# Patient Record
Sex: FEMALE | Race: OTHER | NOT HISPANIC OR LATINO | URBAN - METROPOLITAN AREA
[De-identification: names, ages, dates, MRNs, and addresses within clinical notes are randomized per-mention and may not be internally consistent; named-entity substitution may affect disease eponyms.]

---

## 2023-05-30 ENCOUNTER — EMERGENCY (EMERGENCY)
Facility: HOSPITAL | Age: 28
LOS: 1 days | Discharge: ANOTHER HOSPITAL | End: 2023-05-30
Attending: EMERGENCY MEDICINE | Admitting: EMERGENCY MEDICINE
Payer: MEDICAID

## 2023-05-30 VITALS
SYSTOLIC BLOOD PRESSURE: 118 MMHG | TEMPERATURE: 98 F | DIASTOLIC BLOOD PRESSURE: 74 MMHG | RESPIRATION RATE: 18 BRPM | OXYGEN SATURATION: 100 % | HEART RATE: 69 BPM

## 2023-05-30 DIAGNOSIS — R10.2 PELVIC AND PERINEAL PAIN: ICD-10-CM

## 2023-05-30 DIAGNOSIS — Z87.19 PERSONAL HISTORY OF OTHER DISEASES OF THE DIGESTIVE SYSTEM: ICD-10-CM

## 2023-05-30 DIAGNOSIS — R10.30 LOWER ABDOMINAL PAIN, UNSPECIFIED: ICD-10-CM

## 2023-05-30 DIAGNOSIS — Z87.42 PERSONAL HISTORY OF OTHER DISEASES OF THE FEMALE GENITAL TRACT: ICD-10-CM

## 2023-05-30 DIAGNOSIS — N83.9 NONINFLAMMATORY DISORDER OF OVARY, FALLOPIAN TUBE AND BROAD LIGAMENT, UNSPECIFIED: ICD-10-CM

## 2023-05-30 PROCEDURE — 99285 EMERGENCY DEPT VISIT HI MDM: CPT

## 2023-05-30 NOTE — ED ADULT TRIAGE NOTE - CHIEF COMPLAINT QUOTE
pt. c/o lower abdominal pain which began today, reports having appendicitis in January but was given antibiotics instead of removing her appendix.

## 2023-05-31 ENCOUNTER — EMERGENCY (EMERGENCY)
Facility: HOSPITAL | Age: 28
LOS: 1 days | Discharge: HOME | End: 2023-05-31
Attending: STUDENT IN AN ORGANIZED HEALTH CARE EDUCATION/TRAINING PROGRAM | Admitting: STUDENT IN AN ORGANIZED HEALTH CARE EDUCATION/TRAINING PROGRAM
Payer: MEDICAID

## 2023-05-31 VITALS
DIASTOLIC BLOOD PRESSURE: 67 MMHG | HEART RATE: 57 BPM | OXYGEN SATURATION: 99 % | SYSTOLIC BLOOD PRESSURE: 102 MMHG | TEMPERATURE: 98 F | RESPIRATION RATE: 16 BRPM

## 2023-05-31 VITALS
DIASTOLIC BLOOD PRESSURE: 86 MMHG | OXYGEN SATURATION: 96 % | SYSTOLIC BLOOD PRESSURE: 126 MMHG | WEIGHT: 156.09 LBS | TEMPERATURE: 99 F | RESPIRATION RATE: 16 BRPM | HEIGHT: 63 IN | HEART RATE: 73 BPM

## 2023-05-31 VITALS
DIASTOLIC BLOOD PRESSURE: 66 MMHG | HEART RATE: 69 BPM | TEMPERATURE: 99 F | OXYGEN SATURATION: 97 % | RESPIRATION RATE: 16 BRPM | SYSTOLIC BLOOD PRESSURE: 110 MMHG

## 2023-05-31 DIAGNOSIS — Z87.19 PERSONAL HISTORY OF OTHER DISEASES OF THE DIGESTIVE SYSTEM: ICD-10-CM

## 2023-05-31 DIAGNOSIS — R10.2 PELVIC AND PERINEAL PAIN: ICD-10-CM

## 2023-05-31 DIAGNOSIS — N80.121 DEEP ENDOMETRIOSIS OF RIGHT OVARY: ICD-10-CM

## 2023-05-31 DIAGNOSIS — R19.7 DIARRHEA, UNSPECIFIED: ICD-10-CM

## 2023-05-31 DIAGNOSIS — R20.2 PARESTHESIA OF SKIN: ICD-10-CM

## 2023-05-31 DIAGNOSIS — N83.201 UNSPECIFIED OVARIAN CYST, RIGHT SIDE: ICD-10-CM

## 2023-05-31 LAB
ALBUMIN SERPL ELPH-MCNC: 3.8 G/DL — SIGNIFICANT CHANGE UP (ref 3.4–5)
ALP SERPL-CCNC: 48 U/L — SIGNIFICANT CHANGE UP (ref 40–120)
ALT FLD-CCNC: 19 U/L — SIGNIFICANT CHANGE UP (ref 12–42)
ANION GAP SERPL CALC-SCNC: 8 MMOL/L — LOW (ref 9–16)
APPEARANCE UR: CLEAR — SIGNIFICANT CHANGE UP
AST SERPL-CCNC: 23 U/L — SIGNIFICANT CHANGE UP (ref 15–37)
BASOPHILS # BLD AUTO: 0.06 K/UL — SIGNIFICANT CHANGE UP (ref 0–0.2)
BASOPHILS NFR BLD AUTO: 0.5 % — SIGNIFICANT CHANGE UP (ref 0–2)
BILIRUB SERPL-MCNC: 0.3 MG/DL — SIGNIFICANT CHANGE UP (ref 0.2–1.2)
BILIRUB UR-MCNC: NEGATIVE — SIGNIFICANT CHANGE UP
BUN SERPL-MCNC: 5 MG/DL — LOW (ref 7–23)
CALCIUM SERPL-MCNC: 8.9 MG/DL — SIGNIFICANT CHANGE UP (ref 8.5–10.5)
CHLORIDE SERPL-SCNC: 104 MMOL/L — SIGNIFICANT CHANGE UP (ref 96–108)
CO2 SERPL-SCNC: 26 MMOL/L — SIGNIFICANT CHANGE UP (ref 22–31)
COLOR SPEC: YELLOW — SIGNIFICANT CHANGE UP
CREAT SERPL-MCNC: 0.75 MG/DL — SIGNIFICANT CHANGE UP (ref 0.5–1.3)
DIFF PNL FLD: NEGATIVE — SIGNIFICANT CHANGE UP
EGFR: 110 ML/MIN/1.73M2 — SIGNIFICANT CHANGE UP
EOSINOPHIL # BLD AUTO: 0.06 K/UL — SIGNIFICANT CHANGE UP (ref 0–0.5)
EOSINOPHIL NFR BLD AUTO: 0.5 % — SIGNIFICANT CHANGE UP (ref 0–6)
GLUCOSE SERPL-MCNC: 96 MG/DL — SIGNIFICANT CHANGE UP (ref 70–99)
GLUCOSE UR QL: NEGATIVE MG/DL — SIGNIFICANT CHANGE UP
HCG SERPL-ACNC: 1 MIU/ML — SIGNIFICANT CHANGE UP
HCG UR QL: NEGATIVE — SIGNIFICANT CHANGE UP
HCT VFR BLD CALC: 39.6 % — SIGNIFICANT CHANGE UP (ref 34.5–45)
HGB BLD-MCNC: 13 G/DL — SIGNIFICANT CHANGE UP (ref 11.5–15.5)
IMM GRANULOCYTES NFR BLD AUTO: 0.4 % — SIGNIFICANT CHANGE UP (ref 0–0.9)
KETONES UR-MCNC: 15 MG/DL
LACTATE SERPL-SCNC: 0.5 MMOL/L — SIGNIFICANT CHANGE UP (ref 0.4–2)
LEUKOCYTE ESTERASE UR-ACNC: NEGATIVE — SIGNIFICANT CHANGE UP
LIDOCAIN IGE QN: 63 U/L — LOW (ref 73–393)
LYMPHOCYTES # BLD AUTO: 0.93 K/UL — LOW (ref 1–3.3)
LYMPHOCYTES # BLD AUTO: 8.3 % — LOW (ref 13–44)
MCHC RBC-ENTMCNC: 29.8 PG — SIGNIFICANT CHANGE UP (ref 27–34)
MCHC RBC-ENTMCNC: 32.8 GM/DL — SIGNIFICANT CHANGE UP (ref 32–36)
MCV RBC AUTO: 90.8 FL — SIGNIFICANT CHANGE UP (ref 80–100)
MONOCYTES # BLD AUTO: 0.71 K/UL — SIGNIFICANT CHANGE UP (ref 0–0.9)
MONOCYTES NFR BLD AUTO: 6.3 % — SIGNIFICANT CHANGE UP (ref 2–14)
NEUTROPHILS # BLD AUTO: 9.47 K/UL — HIGH (ref 1.8–7.4)
NEUTROPHILS NFR BLD AUTO: 84 % — HIGH (ref 43–77)
NITRITE UR-MCNC: NEGATIVE — SIGNIFICANT CHANGE UP
NRBC # BLD: 0 /100 WBCS — SIGNIFICANT CHANGE UP (ref 0–0)
PH UR: 7 — SIGNIFICANT CHANGE UP (ref 5–8)
PLATELET # BLD AUTO: 265 K/UL — SIGNIFICANT CHANGE UP (ref 150–400)
POTASSIUM SERPL-MCNC: 3.7 MMOL/L — SIGNIFICANT CHANGE UP (ref 3.5–5.3)
POTASSIUM SERPL-SCNC: 3.7 MMOL/L — SIGNIFICANT CHANGE UP (ref 3.5–5.3)
PROT SERPL-MCNC: 7.5 G/DL — SIGNIFICANT CHANGE UP (ref 6.4–8.2)
PROT UR-MCNC: NEGATIVE MG/DL — SIGNIFICANT CHANGE UP
RBC # BLD: 4.36 M/UL — SIGNIFICANT CHANGE UP (ref 3.8–5.2)
RBC # FLD: 13.5 % — SIGNIFICANT CHANGE UP (ref 10.3–14.5)
SODIUM SERPL-SCNC: 138 MMOL/L — SIGNIFICANT CHANGE UP (ref 132–145)
SP GR SPEC: 1 — LOW (ref 1–1.03)
UROBILINOGEN FLD QL: 0.2 MG/DL — SIGNIFICANT CHANGE UP (ref 0.2–1)
WBC # BLD: 11.27 K/UL — HIGH (ref 3.8–10.5)
WBC # FLD AUTO: 11.27 K/UL — HIGH (ref 3.8–10.5)

## 2023-05-31 PROCEDURE — 99284 EMERGENCY DEPT VISIT MOD MDM: CPT | Mod: 25

## 2023-05-31 PROCEDURE — 76830 TRANSVAGINAL US NON-OB: CPT | Mod: 26

## 2023-05-31 PROCEDURE — 76830 TRANSVAGINAL US NON-OB: CPT

## 2023-05-31 PROCEDURE — 76856 US EXAM PELVIC COMPLETE: CPT

## 2023-05-31 PROCEDURE — 76856 US EXAM PELVIC COMPLETE: CPT | Mod: 26

## 2023-05-31 PROCEDURE — 99285 EMERGENCY DEPT VISIT HI MDM: CPT

## 2023-05-31 PROCEDURE — 99284 EMERGENCY DEPT VISIT MOD MDM: CPT

## 2023-05-31 PROCEDURE — 74177 CT ABD & PELVIS W/CONTRAST: CPT | Mod: 26

## 2023-05-31 RX ORDER — SODIUM CHLORIDE 9 MG/ML
1000 INJECTION INTRAMUSCULAR; INTRAVENOUS; SUBCUTANEOUS ONCE
Refills: 0 | Status: COMPLETED | OUTPATIENT
Start: 2023-05-31 | End: 2023-05-31

## 2023-05-31 RX ADMIN — SODIUM CHLORIDE 1000 MILLILITER(S): 9 INJECTION INTRAMUSCULAR; INTRAVENOUS; SUBCUTANEOUS at 00:16

## 2023-05-31 NOTE — ED PROVIDER NOTE - OBJECTIVE STATEMENT
28 yr old female, history of appendicitis (medical tx only), presents to the Emergency Department as transfer from [facility] for US / GYN evaluation. pt initially went to [facility] for sudden onset lower abd pain similar to previous cyst rupture.   at [facility] - wbc count 11.27, UA w/o infection. CT w normal appendix, enlarged right ovary due to large ovarian cyst suspicion for ovarian torsion. pt transferred for urgent tvus.   on arrival pt pain somewhat improved. 3-4/10. no n/v. no abd pain.       CT "IMPRESSION:  Markedly enlarged right ovary due to a 6.8 cm complex cyst. The ovary is   medialized and anterior to the uterus, suspicious for ovarian torsion.   Recommend emergent pelvic ultrasound to exclude ovarian torsion.  Normal appendix."

## 2023-05-31 NOTE — ED PROVIDER NOTE - PATIENT PORTAL LINK FT
You can access the FollowMyHealth Patient Portal offered by [facility] by registering at the following website: [email or web address]. By joining [facility]’s FollowMyHealth portal, you will also be able to view your health information using other applications (apps) compatible with our system.

## 2023-05-31 NOTE — ED ADULT NURSE NOTE - CHIEF COMPLAINT QUOTE
Pt transferred from [facility] ED for r/o ovarian torsion Pt c/o bilateral lower abd/pelvic pain since 9pm. Pt denies any N/V/D, no urinary symptoms.

## 2023-05-31 NOTE — ED PROVIDER NOTE - ATTENDING CONTRIBUTION TO CARE
I have seen the pt, reviewed all pertinent clinical data, and I agree with the documentation/care/plan executed by HERI Poole. + enlarged ovary w/lower abd vs pelvic pain, requires emergent transfer to [facility] for u/s to r/o torsion.

## 2023-05-31 NOTE — ED ADULT NURSE NOTE - OBJECTIVE STATEMENT
Patient complaints of bilateral lower abdominal/pelvic pain since yesterday 9pm. transferred from [facility] r/o ovarian torsion.   States pain is getting better now. denies SOB, CP, N/V/D or any other discomfort at this time.  Patient is alert and oriented, A&O4, steady gait noted.

## 2023-05-31 NOTE — ED ADULT NURSE REASSESSMENT NOTE - NS ED NURSE REASSESS COMMENT FT1
Received patient from GEOVANNA Ward. Pt resting in stretcher, in no acute distress at this time, respirations even bilaterally. Safety measures maintained, pending further dispo.

## 2023-05-31 NOTE — ED ADULT NURSE REASSESSMENT NOTE - NS ED NURSE REASSESS COMMENT FT1
Report received from GEOVANNA Palacios. Pt resting on exam table. Appears in distress, A+Ox4, no respiratory distress noted. Pending CT read.

## 2023-05-31 NOTE — ED PROVIDER NOTE - PROGRESS NOTE DETAILS
CT shows 6.8cm ovary - needs pelvis US for further eval.  Will send to [facility] - patient agreeable.

## 2023-05-31 NOTE — ED PROVIDER NOTE - OBJECTIVE STATEMENT
28-year-old female, history of appendicitis only medically treated, presenting to the ED complaining of lower abdominal pain that suddenly began while working about 2 hours ago.  Patient states the pain is improved from when it first began.  She also endorses history of a prior right-sided ovarian cyst with rupture.  She states the symptoms are somewhat similar to that as well.  In January of this year, when patient was diagnosed with appendicitis, it was treated medically without any surgical removal.  She denies fevers, chills, urinary symptoms, nausea, vomiting, diarrhea, chest pain, shortness of breath, headaches or dizziness.

## 2023-05-31 NOTE — CONSULT NOTE ADULT - SUBJECTIVE AND OBJECTIVE BOX
29yo  presents to the [facility] ER from [facility] for r/o ovarian torsion.  Pt complains of sudden onset of whole body pain around  last night.  Pt states she was at work and she had severe abdominal pain that radiated to her legs and arms and caused her to feel tingling.  Pt states the pain lasted 4 hours and describes pain as diffuse sharp abdominal pain on Left and Right side. Pt also reports 1 episode of diarrhea during this time.  Pt states  she has had R ovarian cysts since January of this year and has been following her obgyn for observation.  Pt has been to the ER 2 other times this year for ovarian cyst pain in January and February and was once found to have appendicitis that was treated medically.  Pt states the pain she felt overnight was not similar to her previous R sided ovarian cyst pain.  Pt states she never received pain medication in either ER overnight and is no longer feeling pain.  Pt denies fever, chills, chest pain, SOB, abdominal pain at this time, nausea, vomiting, vaginal bleeding      OB/GYN Hx: G1: [facility] med 2017   Last PAP smear: abnl papsmear in , requiring colposcopy wnl.  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     PHYSICAL EXAM:   Vital Signs Last 24 Hrs  T(C): 37 (31 May 2023 03:32), Max: 37.1 (31 May 2023 03:05)  T(F): 98.6 (31 May 2023 03:32), Max: 98.8 (31 May 2023 03:05)  HR: 73 (31 May 2023 03:32) (69 - 75)  BP: 126/86 (31 May 2023 03:32) (110/66 - 126/86)  RR: 16 (31 May 2023 03:32) (16 - 19)  SpO2: 96% (31 May 2023 03:32) (96% - 100%)    Parameters below as of 31 May 2023 03:32  Patient On (Oxygen Delivery Method): room air        **************************  Constitutional: Alert & Oriented x3, No acute distress  Respiratory: Clear to ausculation bilaterally; no wheezing, rhonchi, or crackles  Cardiovascular: regular rate and rhythm, no murmurs, or gallops  Gastrointestinal: soft, non tender, positive bowel sounds, no rebound or guarding   Pelvic exam: closed cervix, normal physiologic discharge, no CMT  Extremities: no calf tenderness or swelling    LABS:                        13.0   11.27 )-----------( 265      ( 31 May 2023 00:00 )             39.6         138  |  104  |  5<L>  ----------------------------<  96  3.7   |  26  |  0.75    Ca    8.9      31 May 2023 00:00    TPro  7.5  /  Alb  3.8  /  TBili  0.3  /  DBili  x   /  AST  23  /  ALT  19  /  AlkPhos  48        Urinalysis Basic - ( 31 May 2023 00:00 )    Color: Yellow / Appearance: Clear / S.004 / pH: x  Gluc: x / Ketone: 15 mg/dL  / Bili: Negative / Urobili: 0.2 mg/dL   Blood: x / Protein: Negative mg/dL / Nitrite: Negative   Leuk Esterase: Negative / RBC: x / WBC x   Sq Epi: x / Non Sq Epi: x / Bacteria: x      HCG Quantitative, Serum: 1 mIU/mL ( @ 00:00)      RADIOLOGY & ADDITIONAL STUDIES:   ACC: [id number] EXAM:  CT ABDOMEN AND PELVIS IC   ORDERED BY: JONH RIVERA     PROCEDURE DATE:  2023          INTERPRETATION:  CLINICAL INFORMATION: Lower abdominal pain. History of   appendicitis without appendectomy.    COMPARISON: None.    CONTRAST/COMPLICATIONS:  IV Contrast: Omnipaque 350  96 cc administered   4 cc discarded  Oral Contrast: NONE  Complications: None reported at time of study completion    PROCEDURE:  CT of the Abdomen and Pelvis was performed.  Sagittal and coronal reformats were performed.    FINDINGS:  LOWER CHEST: Within normal limits.    LIVER: Within normal limits.  BILE DUCTS: Normal caliber.  GALLBLADDER: Within normal limits.  SPLEEN: Within normal limits.  PANCREAS: Within normal limits.  ADRENALS: Within normal limits.  KIDNEYS/URETERS: Within normal limits.    BLADDER: Within normal limits.  REPRODUCTIVE ORGANS: The right ovary is markedly enlarged, 5.1 x 6.7 x   6.1 cm due to a 4.6 x 6.8 x 4.7 cm complex cyst with thin internal   septation. The right ovary is medialized and anterior to the uterus,   suspicious for ovarian torsion. Normal uterus and left adnexa.    BOWEL: No bowel obstruction. Large colonic stool burden, which may   represent constipation.. Appendix is normal.  PERITONEUM: No ascites.  VESSELS: Within normal limits.  RETROPERITONEUM/LYMPH NODES: No lymphadenopathy.  ABDOMINAL WALL: Within normal limits.  BONES: Within normal limits.    IMPRESSION:    Markedly enlarged right ovary due to a 6.8 cm complex cyst. The ovary is   medialized and anterior to the uterus, suspicious for ovarian torsion.   Recommend emergent pelvic ultrasound to exclude ovarian torsion.    Normal appendix.    Findings were discussed with HERI Rivera 2023 2:02 AM by Dr. Salter with read back confirmation.    --- End of Report ---          MIGUEL SALTER MD; Resident Radiologist  This document has been electronically signed.  ELISA ADAMS MD; Attending Radiologist  This document has been electronically signed. May 31 2023  2:22AM  ACC: [id number] EXAM:  CT ABDOMEN AND PELVIS IC   ORDERED BY: JONH RIVERA     PROCEDURE DATE:  2023          INTERPRETATION:  CLINICAL INFORMATION: Lower abdominal pain. History of   appendicitis without appendectomy.    COMPARISON: None.    CONTRAST/COMPLICATIONS:  IV Contrast: Omnipaque 350  96 cc administered   4 cc discarded  Oral Contrast: NONE  Complications: None reported at time of study completion    PROCEDURE:  CT of the Abdomen and Pelvis was performed.  Sagittal and coronal reformats were performed.    FINDINGS:  LOWER CHEST: Within normal limits.    LIVER: Within normal limits.  BILE DUCTS: Normal caliber.  GALLBLADDER: Within normal limits.  SPLEEN: Within normal limits.  PANCREAS: Within normal limits.  ADRENALS: Within normal limits.  KIDNEYS/URETERS: Within normal limits.    BLADDER: Within normal limits.  REPRODUCTIVE ORGANS: The right ovary is markedly enlarged, 5.1 x 6.7 x   6.1 cm due to a 4.6 x 6.8 x 4.7 cm complex cyst with thin internal   septation. The right ovary is medialized and anterior to the uterus,   suspicious for ovarian torsion. Normal uterus and left adnexa.    BOWEL: No bowel obstruction. Large colonic stool burden, which may   represent constipation.. Appendix is normal.  PERITONEUM: No ascites.  VESSELS: Within normal limits.  RETROPERITONEUM/LYMPH NODES: No lymphadenopathy.  ABDOMINAL WALL: Within normal limits.  BONES: Within normal limits.    IMPRESSION:    Markedly enlarged right ovary due to a 6.8 cm complex cyst. The ovary is   medialized and anterior to the uterus, suspicious for ovarian torsion.   Recommend emergent pelvic ultrasound to exclude ovarian torsion.    Normal appendix.    Findings were discussed with HERI Rivera 2023 2:02 AM by Dr. Salter with read back confirmation.    --- End of Report ---          MIGUEL SALTER MD; Resident Radiologist  This document has been electronically signed.  ELISA ADAMS MD; Attending Radiologist  This document has been electronically signed. May 31 2023  2:22AMACC: [id number] EXAM:  CT ABDOMEN AND PELVIS IC   ORDERED BY: JONH RIVERA     PROCEDURE DATE:  2023          INTERPRETATION:  CLINICAL INFORMATION: Lower abdominal pain. History of   appendicitis without appendectomy.    COMPARISON: None.    CONTRAST/COMPLICATIONS:  IV Contrast: Omnipaque 350  96 cc administered   4 cc discarded  Oral Contrast: NONE  Complications: None reported at time of study completion    PROCEDURE:  CT of the Abdomen and Pelvis was performed.  Sagittal and coronal reformats were performed.    FINDINGS:  LOWER CHEST: Within normal limits.    LIVER: Within normal limits.  BILE DUCTS: Normal caliber.  GALLBLADDER: Within normal limits.  SPLEEN: Within normal limits.  PANCREAS: Within normal limits.  ADRENALS: Within normal limits.  KIDNEYS/URETERS: Within normal limits.    BLADDER: Within normal limits.  REPRODUCTIVE ORGANS: The right ovary is markedly enlarged, 5.1 x 6.7 x   6.1 cm due to a 4.6 x 6.8 x 4.7 cm complex cyst with thin internal   septation. The right ovary is medialized and anterior to the uterus,   suspicious for ovarian torsion. Normal uterus and left adnexa.    BOWEL: No bowel obstruction. Large colonic stool burden, which may   represent constipation.. Appendix is normal.  PERITONEUM: No ascites.  VESSELS: Within normal limits.  RETROPERITONEUM/LYMPH NODES: No lymphadenopathy.  ABDOMINAL WALL: Within normal limits.  BONES: Within normal limits.    IMPRESSION:    Markedly enlarged right ovary due to a 6.8 cm complex cyst. The ovary is   medialized and anterior to the uterus, suspicious for ovarian torsion.   Recommend emergent pelvic ultrasound to exclude ovarian torsion.    Normal appendix.    Findings were discussed with HERI Rivera 2023 2:02 AM by Dr. Salter with read back confirmation.    --- End of Report ---          MIGUEL SALTRE MD; Resident Radiologist  This document has been electronically signed.  ELISA ADAMS MD; Attending Radiologist  This document has been electronically signed. May 31 2023  2:22AM  ACC: [id number] EXAM:  CT ABDOMEN AND PELVIS IC   ORDERED BY: JONH RIVERA     PROCEDURE DATE:  2023          INTERPRETATION:  CLINICAL INFORMATION: Lower abdominal pain. History of   appendicitis without appendectomy.    COMPARISON: None.    CONTRAST/COMPLICATIONS:  IV Contrast: Omnipaque 350  96 cc administered   4 cc discarded  Oral Contrast: NONE  Complications: None reported at time of study completion    PROCEDURE:  CT of the Abdomen and Pelvis was performed.  Sagittal and coronal reformats were performed.    FINDINGS:  LOWER CHEST: Within normal limits.    LIVER: Within normal limits.  BILE DUCTS: Normal caliber.  GALLBLADDER: Within normal limits.  SPLEEN: Within normal limits.  PANCREAS: Within normal limits.  ADRENALS: Within normal limits.  KIDNEYS/URETERS: Within normal limits.    BLADDER: Within normal limits.  REPRODUCTIVE ORGANS: The right ovary is markedly enlarged, 5.1 x 6.7 x   6.1 cm due to a 4.6 x 6.8 x 4.7 cm complex cyst with thin internal   septation. The right ovary is medialized and anterior to the uterus,   suspicious for ovarian torsion. Normal uterus and left adnexa.    BOWEL: No bowel obstruction. Large colonic stool burden, which may   represent constipation.. Appendix is normal.  PERITONEUM: No ascites.  VESSELS: Within normal limits.  RETROPERITONEUM/LYMPH NODES: No lymphadenopathy.  ABDOMINAL WALL: Within normal limits.  BONES: Within normal limits.    IMPRESSION:    Markedly enlarged right ovary due to a 6.8 cm complex cyst. The ovary is   medialized and anterior to the uterus, suspicious for ovarian torsion.   Recommend emergent pelvic ultrasound to exclude ovarian torsion.    Normal appendix.    Findings were discussed with HERI Rivera 2023 2:02 AM by Dr. Salter with read back confirmation.    --- End of Report ---          MIGUEL SALTER MD; Resident Radiologist  This document has been electronically signed.  ELISA ADAMS MD; Attending Radiologist  This document has been electronically signed. May 31 2023  2:22AM      ******PRELIMINARY REPORT******      ******PRELIMINARY REPORT******         ACC: [id number] EXAM:  US PELVIC COMPLETE   ORDERED BY: EDER GREEN     ACC: [id number] EXAM:  US TRANSVAGINAL   ORDERED BY: EDER GREEN     PROCEDURE DATE:  2023    ******PRELIMINARY REPORT******      ******PRELIMINARY REPORT******           INTERPRETATION:  CLINICAL INFORMATION: Evaluate for ovarian cyst/torsion.    LMP: 2023    COMPARISON: CT abdomen/pelvis 2023    TECHNIQUE:  Endovaginal and transabdominal pelvic sonogram. Color and Spectral   Doppler was performed.    FINDINGS:  Uterus: 7.5 x 3.4 x 4.7 cm. Within normal limits.  Endometrium: 0.4 cm. Within normal limits.    Right ovary: Enlarged, 6.7 x 5.0 x 5.4 cm with a volume of 92 mL. There   are two complex cysts. One measures 4.5 x 4.3 x 4.5 cm, has low-level   internal echoes and a 1.0 x 0.7 x 0.9 cm avascular, echogenic nodule   which may represent hemorrhagic cyst with adherent clot. The other   measures 4.2 x 2.2 x 2.7 cm and has low level internal echoes. Normal   arterial and venous waveforms.  Left ovary: 3.7 x 1.9 x 1.8 cm. Within normal limits. Normal arterial and   venous waveforms.    Fluid: None.    IMPRESSION:    1. Enlarged right ovary with two complex cysts that may represent   hemorrhagic cysts versus endometriomas. Follow-up ultrasound in 6-12   weeks to assess for resolution is recommended.    2. Arterial and venous flow is present to the right ovary at the time of   this study, however intermittent torsion cannot be excluded.          ******PRELIMINARY REPORT******      ******PRELIMINARY REPORT******       MIGUEL SALTER MD; Resident Radiologist  This document is a PRELIMINARY interpretation and is pending final   attending approval. May 31 2023  5:37AM

## 2023-05-31 NOTE — ED PROVIDER NOTE - NS ED ATTENDING STATEMENT MOD
I have seen and examined this patient and fully participated in the care of this patient as the teaching attending.  The service was shared with the JADE.  I reviewed and verified the documentation and independently performed the documented:

## 2023-05-31 NOTE — ED PROVIDER NOTE - PROGRESS NOTE DETAILS
chavez -   US "IMPRESSION:  1. Enlarged right ovary with two complex cysts that may represent   hemorrhagic cysts versus endometriomas. Follow-up ultrasound in 6-12   weeks to assess for resolution is recommended.  2. Arterial and venous flow is present to the right ovary at the time of   this study, however intermittent torsion cannot be excluded."    US as above. on reeval after imaging pain is nearly resolved. 1/10. abd exam benign. gyn consulted to r/o intermittent torsion given imaging findings.   signed out to day team pending gyn eval, dispo per gyn recs. Director - pt received from night team.  Pt resting, reports pain much improved 0-1/10.  VS, labs, imaging reviewed.  Await gyn eval and recommendations; pt states gyn had just left her bedside. Salazar- pt pain remains resolved.  eval by gyn w/ unremarkable exam, likely dc but wants to wait for final rd of sonogram Director - final read w endometriomas, + good flow.  Pt feeling well.  GYN cleared pt for dc.

## 2023-05-31 NOTE — ED PROVIDER NOTE - NSFOLLOWUPINSTRUCTIONS_ED_ALL_ED_FT
OVARIAN CYST - Your workup today has revealed an OVARIAN CYST. Your results are on the pages to follow. This is not completely normal, but is a common finding, and while these cysts can commonly cause discomfort if they change in size or burst, it is usually not life threatening and can be managed safely in the outpatient setting. Regardless, it can be very uncomfortable and requires pain control regularly until the symptoms have resolved. See below for recommendations...    FOLLOW UP - Follow up with GYNECOLOGY within one week.    [facility]  [address]   [phone]    RETURN PRECAUTIONS - Return to the Emergency Department for persistent, worsening, or new symptoms including worsening abdominal pain not controlled by medication suggested, vaginal bleeding more than 4 pads soaked per hour, fever > 102, blood in urine, or any other serious concerns.    ____      PAIN MEDICATIONS -     TYLENOL / MOTRIN -    For symptoms take Motrin and tylenol - THESE MEDICATIONS DO NOT REQUIRE A PRESCRIPTION AND CAN BE PURCHASED IN ANY PHARMACY. Take motrin 600mg every 6 hours as needed, take with food - discontinue use if you notice abdominal pain, blood in stool or black stools. AND / OR... Take tylenol as needed - 650mg every 6 hours as needed, do not exceed 4000mg in 24 hours. You can take one medication or the other. Or, if one medication alone does not seem to be working, you can actually take both tylenol and motrin together and that is acceptable / safe but you should alternate the medications so you are taking something every few hours; consider the following regimen - Take one medication every 3 hours. 7am motrin with breakfast, 10am tylenol, 1pm motirn with lunch, 4pm tylenol, 7pm motrin with dinner, 10pm tylneol before bed. Please rest and remain well hydrated with plenty of fluids.  You can take motrin 600-800mg and tylenol 650mg every 3 hours, switching between the two for pain    OVARIAN CYST - Your workup today has revealed an OVARIAN CYST. Your results are on the pages to follow. This is not completely normal, but is a common finding, and while these cysts can commonly cause discomfort if they change in size or burst, it is usually not life threatening and can be managed safely in the outpatient setting. Regardless, it can be very uncomfortable and requires pain control regularly until the symptoms have resolved. See below for recommendations...    FOLLOW UP - Follow up with GYNECOLOGY within one week.    [facility]  [address]   [phone]    RETURN PRECAUTIONS - Return to the Emergency Department for persistent, worsening, or new symptoms including worsening abdominal pain not controlled by medication suggested, vaginal bleeding more than 4 pads soaked per hour, fever > 102, blood in urine, or any other serious concerns.    ____      PAIN MEDICATIONS -     TYLENOL / MOTRIN -    For symptoms take Motrin and tylenol - THESE MEDICATIONS DO NOT REQUIRE A PRESCRIPTION AND CAN BE PURCHASED IN ANY PHARMACY. Take motrin 600mg every 6 hours as needed, take with food - discontinue use if you notice abdominal pain, blood in stool or black stools. AND / OR... Take tylenol as needed - 650mg every 6 hours as needed, do not exceed 4000mg in 24 hours. You can take one medication or the other. Or, if one medication alone does not seem to be working, you can actually take both tylenol and motrin together and that is acceptable / safe but you should alternate the medications so you are taking something every few hours; consider the following regimen - Take one medication every 3 hours. 7am motrin with breakfast, 10am tylenol, 1pm motirn with lunch, 4pm tylenol, 7pm motrin with dinner, 10pm tylneol before bed.

## 2023-05-31 NOTE — CONSULT NOTE ADULT - ASSESSMENT
29yo  presents to the ED for abdominal pain overnight, with known 6.8cm R ovarian cyst, r/o ovarian torsion.   -Pt seen at bedside in no apparent pain/distress.  Abdominal exam benign. Pt denies all complaints now and has not received any pain medications    -VSS, H stable as above at 13, no vaginal bleeding.   -Pt sent over for US evaluation after CT above resulted as Markedly enlarged right ovary due to a 6.8 cm complex cyst. The ovary is  medialized and anterior to the uterus, suspicious for ovarian torsion.  Recommend emergent pelvic ultrasound to exclude ovarian torsion. Normal appendix.  -US done here shows flow, no evidence of ovarian torsion on US or clinical. Enlarged right ovary with two complex cysts that may represent hemorrhagic cysts versus endometriomas. Follow-up ultrasound in 6-12 weeks to assess for resolution is recommended.  Arterial and venous flow is present to the right ovary at the time of his study, however intermittent torsion cannot be excluded.  -Pt has not had pain since 12AM overnight- and pain described seems diffuse to whole body. Abdominal exam is benign and pt has not been given any pain medication.  Pt advised to come back to the ER right away if she is experiencing any pain. Given strict return precautions.   -Pt requesting new OB-GYN pt may follow with our clinic on [address] [phone]. Please have her make appointment for this next week to evaluate and management of cyst  -Dr. Lutz in agreement w/ plan and pt is clinically stable for discharge, pt is unlikely torsed and has flow on US and has not had pain for 8 hours now.  Pt has not received pain medication throughout time in ERs and describes pain as whole body.  Pt to follow with [facility] clinic within the week to discuss management of cyst

## 2023-05-31 NOTE — ED ADULT NURSE NOTE - NSFALLUNIVINTERV_ED_ALL_ED
Bed/Stretcher in lowest position, wheels locked, appropriate side rails in place/Call bell, personal items and telephone in reach/Instruct patient to call for assistance before getting out of bed/chair/stretcher/Non-slip footwear applied when patient is off stretcher/[place] to call system/Physically safe environment - no spills, clutter or unnecessary equipment/Purposeful proactive rounding/Room/bathroom lighting operational, light cord in reach

## 2023-05-31 NOTE — ED PROVIDER NOTE - NS ED ROS FT
+abd pain  Denies fevers, chills, nausea, vomiting, diarrhea, constipation, urinary symptoms, chest pain, palpitations, shortness of breath, dyspnea on exertion, syncope/near syncope, cough/URI symptoms, headache, weakness, numbness, focal deficits, visual changes, gait or balance changes, dizziness

## 2023-05-31 NOTE — ED PROVIDER NOTE - PHYSICAL EXAMINATION
VITAL SIGNS: I have reviewed nursing notes and confirm.  CONSTITUTIONAL: Well-developed; well-nourished; in no acute distress.  SKIN: Skin is warm and dry, no acute rash.  HEAD: Normocephalic; atraumatic.  NECK: Supple; non tender.  CARD: S1, S2 normal; no murmurs, gallops, or rubs. Regular rate and rhythm.  RESP: No wheezes, rales or rhonchi.  ABD: Soft; non-distended; +tatianna lower abdominal ttp - mild; no rebound or guarding.  EXT: Normal ROM. No clubbing, cyanosis or edema.  NEURO: Alert, oriented. Grossly unremarkable. WHITMORE, normal tone, no gross motor or sensory changes. Fluent speech.   PSYCH: Cooperative, appropriate. Mood and affect wnl. VITAL SIGNS: I have reviewed nursing notes and confirm.  CONSTITUTIONAL: Well-developed; well-nourished; in no acute distress.  SKIN: Skin is warm and dry, no acute rash.  HEAD: Normocephalic; atraumatic.  NECK: Supple; non tender.  CARD: S1, S2 normal; no murmurs, gallops, or rubs. Regular rate and rhythm.  RESP: No wheezes, rales or rhonchi.  ABD: Soft; non-distended; +tatianna lower abdominal ttp - mild; no rebound or guarding.  : +mild R adnexal ttp; no vaginal bleeding or DC.  NEURO: Alert, oriented. Grossly unremarkable. WHITMORE, normal tone, no gross motor or sensory changes. Fluent speech.   PSYCH: Cooperative, appropriate. Mood and affect wnl.

## 2023-05-31 NOTE — ED PROVIDER NOTE - CLINICAL SUMMARY MEDICAL DECISION MAKING FREE TEXT BOX
pt w hx of medically tx appendicitis, here as transfer from [facility] for TVUS imaging after CT showed enlarged ovary / R ovarian cyst concerning for torsion. pt initially presented for sudden onset R abd pain yesterday evening.   pt well appearing, stable vitals, exam reassuring - abd exam benign.   ordered for STAT US imaging on arrival.   reassuring pain is improved but r/o torsion vs cyst rupture.   hcg neg prior to transfer so not concerned for ectopic.   plan - tvus  analgesia prn - denied on initial eval  serial abd exams  gyn eval prn

## 2023-05-31 NOTE — ED ADULT TRIAGE NOTE - CHIEF COMPLAINT QUOTE
Pt transferred from [facility] ED for r/o ovarian torsion Pt c/o bilateral lower abd/pelvic pain since 9pm. Pt denies any N/V/D, no urinary symptoms.

## 2023-05-31 NOTE — ED PROVIDER NOTE - CLINICAL SUMMARY MEDICAL DECISION MAKING FREE TEXT BOX
28-year-old female, history of appendicitis only medically treated, presenting to the ED complaining of lower abdominal pain that suddenly began while working about 2 hours ago. Patient noted to have tenderness to palpation along the bilateral lower abdominal region.  She is otherwise well-appearing and in no acute distress.  Will plan to obtain CT abdomen pelvis, give IV fluids, give medical labs.  Pain meds offered and refused at this time as patient's pain is not very severe.  Will reassess and dispo pending medical work-up.

## 2023-06-01 LAB
CULTURE RESULTS: SIGNIFICANT CHANGE UP
SPECIMEN SOURCE: SIGNIFICANT CHANGE UP

## 2024-05-15 NOTE — ED ADULT TRIAGE NOTE - HISTORY OF COVID-19 VACCINATION
Patient requests all Lab, Cardiology, and Radiology Results on their Discharge Instructions
Vaccine status unknown

## 2025-07-16 PROBLEM — Z00.00 ENCOUNTER FOR PREVENTIVE HEALTH EXAMINATION: Status: ACTIVE | Noted: 2025-07-16

## 2025-08-27 ENCOUNTER — NON-APPOINTMENT (OUTPATIENT)
Age: 30
End: 2025-08-27

## 2025-08-27 ENCOUNTER — APPOINTMENT (OUTPATIENT)
Dept: OBGYN | Facility: CLINIC | Age: 30
End: 2025-08-27

## 2025-08-27 VITALS
HEIGHT: 62 IN | HEART RATE: 62 BPM | SYSTOLIC BLOOD PRESSURE: 101 MMHG | WEIGHT: 162 LBS | OXYGEN SATURATION: 98 % | DIASTOLIC BLOOD PRESSURE: 67 MMHG | BODY MASS INDEX: 29.81 KG/M2

## 2025-08-27 DIAGNOSIS — Z78.9 OTHER SPECIFIED HEALTH STATUS: ICD-10-CM

## 2025-08-27 DIAGNOSIS — J45.909 UNSPECIFIED ASTHMA, UNCOMPLICATED: ICD-10-CM

## 2025-08-27 DIAGNOSIS — N83.209 UNSPECIFIED OVARIAN CYST, UNSPECIFIED SIDE: ICD-10-CM

## 2025-08-27 PROCEDURE — 99203 OFFICE O/P NEW LOW 30 MIN: CPT
